# Patient Record
Sex: FEMALE | Race: WHITE | NOT HISPANIC OR LATINO | URBAN - METROPOLITAN AREA
[De-identification: names, ages, dates, MRNs, and addresses within clinical notes are randomized per-mention and may not be internally consistent; named-entity substitution may affect disease eponyms.]

---

## 2017-06-14 ENCOUNTER — OFFICE (OUTPATIENT)
Dept: URBAN - METROPOLITAN AREA CLINIC 10 | Facility: CLINIC | Age: 77
End: 2017-06-14
Payer: COMMERCIAL

## 2017-06-14 VITALS
WEIGHT: 125 LBS | HEIGHT: 61 IN | HEART RATE: 64 BPM | SYSTOLIC BLOOD PRESSURE: 147 MMHG | DIASTOLIC BLOOD PRESSURE: 84 MMHG

## 2017-06-14 DIAGNOSIS — R19.4 CHANGE IN BOWEL HABIT: ICD-10-CM

## 2017-06-14 DIAGNOSIS — R11.0 NAUSEA: ICD-10-CM

## 2017-06-14 DIAGNOSIS — Z86.010 PERSONAL HISTORY OF COLONIC POLYPS: ICD-10-CM

## 2017-06-14 LAB
CBC COMPLETE BLOOD COUNT W/O DIFF: HEMATOCRIT: 42.2 % (ref 36–48)
CBC COMPLETE BLOOD COUNT W/O DIFF: HEMOGLOBIN: 13.4 G/DL (ref 12–16)
CBC COMPLETE BLOOD COUNT W/O DIFF: MCH: 30.9 PG (ref 25–35)
CBC COMPLETE BLOOD COUNT W/O DIFF: MCHC: 31.8 % (ref 30–38)
CBC COMPLETE BLOOD COUNT W/O DIFF: MCV: 97.2 FL (ref 78–102)
CBC COMPLETE BLOOD COUNT W/O DIFF: PLATELET COUNT: 149 K/UL — LOW (ref 150–450)
CBC COMPLETE BLOOD COUNT W/O DIFF: RBC DISTRIBUTION WIDTH: 12.8 % (ref 11.5–16)
CBC COMPLETE BLOOD COUNT W/O DIFF: RED BLOOD CELL COUNT: 4.34 M/UL (ref 4–5.5)
CBC COMPLETE BLOOD COUNT W/O DIFF: WHITE BLOOD CELL COUNT: 3.4 K/UL — LOW (ref 4–11)

## 2017-06-14 PROCEDURE — G8427 DOCREV CUR MEDS BY ELIG CLIN: HCPCS | Performed by: INTERNAL MEDICINE

## 2017-06-14 PROCEDURE — 99214 OFFICE O/P EST MOD 30 MIN: CPT | Performed by: INTERNAL MEDICINE

## 2017-06-14 RX ORDER — POLYETHYLENE GLYCOL 3350, SODIUM SULFATE ANHYDROUS, SODIUM BICARBONATE, SODIUM CHLORIDE, POTASSIUM CHLORIDE 236; 22.74; 6.74; 5.86; 2.97 G/4L; G/4L; G/4L; G/4L; G/4L
POWDER, FOR SOLUTION ORAL
Qty: 1 | Refills: 0 | Status: ACTIVE
Start: 2017-06-14

## 2017-06-14 NOTE — SERVICEHPINOTES
Ms. Christie is 76 years old. She is here for follow up of a personal history of colon cancer now with change in bowel habits. She states that since last visit she has developed some occasional nausea without emesis that will last a few minutes when occurs. She describes as once every 1-2 weeks. She denies weight loss. She has gained weight since last visit. She denies dysphagia. She denies diarrhea and is going to restroom 2-3 times / day but describes occasional thin stools without blood. She states her bowel habits prior were 1-2 times daily. She denies abdominal pain. She denies change in medications or other life changes around the time of the development of her symptoms. She does complain of fatigue. She has been having tachycardia episodes that is being evaluated by cardiology and sounds as if she is having some sort of cardiac ablation soon.  Review of records from Dr. Perez show she has a history of colon cancer dx in 2000 and is s/p partial colectomy. Her last colonoscopy was in 2013 and normal. 4/2016- HCT 44, , MCV 96

## 2017-06-14 NOTE — SERVICENOTES
We discussed her next colonoscopy being 2018 but given her change in symptoms as well as for her peace-of-mind

## 2017-08-15 ENCOUNTER — AMBULATORY SURGICAL CENTER (OUTPATIENT)
Dept: URBAN - METROPOLITAN AREA SURGERY 1 | Facility: SURGERY | Age: 77
End: 2017-08-15
Payer: MEDICARE

## 2017-08-15 VITALS
SYSTOLIC BLOOD PRESSURE: 104 MMHG | TEMPERATURE: 97.8 F | DIASTOLIC BLOOD PRESSURE: 95 MMHG | HEART RATE: 72 BPM | RESPIRATION RATE: 16 BRPM | WEIGHT: 119 LBS | HEIGHT: 61 IN | HEART RATE: 76 BPM | OXYGEN SATURATION: 98 % | SYSTOLIC BLOOD PRESSURE: 82 MMHG | DIASTOLIC BLOOD PRESSURE: 72 MMHG | HEART RATE: 75 BPM | DIASTOLIC BLOOD PRESSURE: 75 MMHG | HEART RATE: 72 BPM | DIASTOLIC BLOOD PRESSURE: 54 MMHG | HEART RATE: 76 BPM | RESPIRATION RATE: 22 BRPM | SYSTOLIC BLOOD PRESSURE: 104 MMHG | DIASTOLIC BLOOD PRESSURE: 59 MMHG | SYSTOLIC BLOOD PRESSURE: 123 MMHG | RESPIRATION RATE: 14 BRPM | SYSTOLIC BLOOD PRESSURE: 82 MMHG | HEART RATE: 75 BPM | HEIGHT: 61 IN | HEART RATE: 80 BPM | RESPIRATION RATE: 22 BRPM | DIASTOLIC BLOOD PRESSURE: 54 MMHG | SYSTOLIC BLOOD PRESSURE: 117 MMHG | SYSTOLIC BLOOD PRESSURE: 117 MMHG | DIASTOLIC BLOOD PRESSURE: 75 MMHG | HEART RATE: 80 BPM | SYSTOLIC BLOOD PRESSURE: 123 MMHG | OXYGEN SATURATION: 100 % | OXYGEN SATURATION: 99 % | OXYGEN SATURATION: 97 % | OXYGEN SATURATION: 98 % | SYSTOLIC BLOOD PRESSURE: 150 MMHG | WEIGHT: 119 LBS | OXYGEN SATURATION: 97 % | DIASTOLIC BLOOD PRESSURE: 59 MMHG | TEMPERATURE: 98.4 F | TEMPERATURE: 97.8 F | DIASTOLIC BLOOD PRESSURE: 95 MMHG | OXYGEN SATURATION: 99 % | OXYGEN SATURATION: 100 % | DIASTOLIC BLOOD PRESSURE: 72 MMHG | TEMPERATURE: 98.4 F | RESPIRATION RATE: 14 BRPM | RESPIRATION RATE: 16 BRPM | SYSTOLIC BLOOD PRESSURE: 150 MMHG

## 2017-08-15 DIAGNOSIS — K64.1 SECOND DEGREE HEMORRHOIDS: ICD-10-CM

## 2017-08-15 DIAGNOSIS — Z86.010 PERSONAL HISTORY OF COLONIC POLYPS: ICD-10-CM

## 2017-08-15 PROCEDURE — G0105 COLORECTAL SCRN; HI RISK IND: HCPCS | Performed by: INTERNAL MEDICINE

## 2017-08-15 PROCEDURE — G8918 PT W/O PREOP ORDER IV AB PRO: HCPCS | Performed by: INTERNAL MEDICINE

## 2017-08-15 PROCEDURE — G8907 PT DOC NO EVENTS ON DISCHARG: HCPCS | Performed by: INTERNAL MEDICINE
